# Patient Record
Sex: MALE | Race: WHITE | Employment: FULL TIME | URBAN - METROPOLITAN AREA
[De-identification: names, ages, dates, MRNs, and addresses within clinical notes are randomized per-mention and may not be internally consistent; named-entity substitution may affect disease eponyms.]

---

## 2021-01-17 ENCOUNTER — HOSPITAL ENCOUNTER (EMERGENCY)
Age: 61
Discharge: HOME | End: 2021-01-17
Payer: COMMERCIAL

## 2021-01-17 ENCOUNTER — APPOINTMENT (OUTPATIENT)
Dept: GENERAL RADIOLOGY | Age: 61
End: 2021-01-17
Payer: COMMERCIAL

## 2021-01-17 VITALS
RESPIRATION RATE: 20 BRPM | HEART RATE: 77 BPM | HEIGHT: 70 IN | DIASTOLIC BLOOD PRESSURE: 85 MMHG | WEIGHT: 260 LBS | BODY MASS INDEX: 37.22 KG/M2 | SYSTOLIC BLOOD PRESSURE: 147 MMHG | TEMPERATURE: 97.4 F | OXYGEN SATURATION: 95 %

## 2021-01-17 DIAGNOSIS — M54.31 SCIATICA OF RIGHT SIDE: ICD-10-CM

## 2021-01-17 DIAGNOSIS — M25.551 ACUTE RIGHT HIP PAIN: Primary | ICD-10-CM

## 2021-01-17 PROCEDURE — 73502 X-RAY EXAM HIP UNI 2-3 VIEWS: CPT

## 2021-01-17 PROCEDURE — 99283 EMERGENCY DEPT VISIT LOW MDM: CPT

## 2021-01-17 PROCEDURE — 6370000000 HC RX 637 (ALT 250 FOR IP): Performed by: PHYSICIAN ASSISTANT

## 2021-01-17 RX ORDER — LIDOCAINE 4 G/G
1 PATCH TOPICAL ONCE
Status: DISCONTINUED | OUTPATIENT
Start: 2021-01-17 | End: 2021-01-17 | Stop reason: HOSPADM

## 2021-01-17 RX ORDER — HYDROCODONE BITARTRATE AND ACETAMINOPHEN 5; 325 MG/1; MG/1
1 TABLET ORAL EVERY 6 HOURS PRN
Qty: 10 TABLET | Refills: 0 | Status: SHIPPED | OUTPATIENT
Start: 2021-01-17 | End: 2021-01-20

## 2021-01-17 RX ORDER — METHYLPREDNISOLONE 4 MG/1
2 TABLET ORAL DAILY
Qty: 3 TABLET | Refills: 0 | Status: SHIPPED | OUTPATIENT
Start: 2021-01-17 | End: 2021-01-23

## 2021-01-17 RX ORDER — LEVOTHYROXINE SODIUM 0.03 MG/1
25 TABLET ORAL DAILY
COMMUNITY

## 2021-01-17 RX ORDER — GABAPENTIN 800 MG/1
800 TABLET ORAL 3 TIMES DAILY
COMMUNITY

## 2021-01-17 RX ORDER — METHOCARBAMOL 750 MG/1
750 TABLET, FILM COATED ORAL 4 TIMES DAILY
COMMUNITY

## 2021-01-17 RX ORDER — CHLORPROMAZINE HYDROCHLORIDE 25 MG/1
25 TABLET, FILM COATED ORAL 3 TIMES DAILY
COMMUNITY

## 2021-01-17 RX ORDER — HYDROCODONE BITARTRATE AND ACETAMINOPHEN 5; 325 MG/1; MG/1
1 TABLET ORAL EVERY 4 HOURS PRN
Qty: 18 TABLET | Refills: 0 | Status: SHIPPED | OUTPATIENT
Start: 2021-01-17 | End: 2021-01-17

## 2021-01-17 ASSESSMENT — ENCOUNTER SYMPTOMS
VOMITING: 0
COUGH: 0
ABDOMINAL DISTENTION: 0
ABDOMINAL PAIN: 0
NAUSEA: 0
WHEEZING: 0
STRIDOR: 0
SHORTNESS OF BREATH: 0
BACK PAIN: 0
COLOR CHANGE: 0

## 2021-01-17 ASSESSMENT — PAIN DESCRIPTION - ORIENTATION: ORIENTATION: RIGHT

## 2021-01-17 NOTE — ED NOTES
Discharge instructions received & reviewed. Two prescriptions  & referral provided.   Alert & ambulatory at this time     Faisal Blackwell RN  01/17/21 9934

## 2021-01-17 NOTE — ED NOTES
Bed: 18  Expected date:   Expected time:   Means of arrival: Walk In  Comments:     Zen Castro RN  01/17/21 1829

## 2021-01-17 NOTE — ED PROVIDER NOTES
[address] [facility]        Pt Name: Sonny Toro  MRN: [id number]  [place] 1960  Date of evaluation: 1/17/2021  Provider: Kelly Fernandez PA-C  PCP: Diandra Lares MD    BAILEE. I have evaluated this patient. My supervising physician was available for consultation. CHIEF COMPLAINT       Chief Complaint   Patient presents with    Hip Pain     Pt c/o right hip pain since thursday with tingling burning sensation to foot. Denies injury \"Venita been getting in and out of a low car\". HISTORY OF PRESENT ILLNESS   (Location, Timing/Onset, Context/Setting, Quality, Duration, Modifying Factors, Severity, Associated Signs and Symptoms)  Note limiting factors. Sonny Toro is a 56 y.o. male who presents to the emergency department complaining of right lateral hip pain with sharp shooting pain down the right leg starting on Thursday. He does have history of degenerative disc disease in both neck and low back. 3 months ago, he was receiving injections of steroids to these areas by a neurosurgeon. He has not had any recent epidural injections. He denies preceding injury such as blunt trauma or fall. Patient is able to bear weight and ambulate. He denies bowel or bladder incontinence retention. Reports a tingling sensation over the right thigh and sharp burning pain in the right shin. Denies any swelling or skin changes. He says that he gets in and out of a low car and thinks that this is the source of his pain. Nursing Notes were all reviewed and agreed with or any disagreements were addressed in the HPI. REVIEW OF SYSTEMS    (2-9 systems for level 4, 10 or more for level 5)     Review of Systems   Constitutional: Negative for chills and fever. HENT: Negative. Eyes: Negative for visual disturbance. Respiratory: Negative for cough, shortness of breath, wheezing and stridor.     Cardiovascular: Negative for chest pain, palpitations and leg swelling. Gastrointestinal: Negative for abdominal distention, abdominal pain, nausea and vomiting. Genitourinary: Negative. Musculoskeletal: Positive for myalgias. Negative for arthralgias, back pain, gait problem, joint swelling, neck pain and neck stiffness. Skin: Negative for color change, pallor, rash and wound. Neurological: Positive for numbness. Negative for dizziness, tremors, seizures, syncope, facial asymmetry, speech difficulty, weakness, light-headedness and headaches. Psychiatric/Behavioral: Negative for confusion. All other systems reviewed and are negative. Positives and Pertinent negatives as per HPI. Except as noted above in the ROS, all other systems were reviewed and negative. PAST MEDICAL HISTORY     Past Medical History:   Diagnosis Date    Hypothyroid          SURGICAL HISTORY     Past Surgical History:   Procedure Laterality Date    BACK SURGERY           CURRENTMEDICATIONS       Previous Medications    CHLORPROMAZINE (THORAZINE) 25 MG TABLET    Take 25 mg by mouth 3 times daily    DICLOFENAC PO    Take 75 mg by mouth    GABAPENTIN (NEURONTIN) 800 MG TABLET    Take 800 mg by mouth 3 times daily. LEVOTHYROXINE (SYNTHROID) 25 MCG TABLET    Take 25 mcg by mouth Daily    METHOCARBAMOL (ROBAXIN) 750 MG TABLET    Take 750 mg by mouth 4 times daily         ALLERGIES     Patient has no known allergies. FAMILYHISTORY     History reviewed. No pertinent family history.        SOCIAL HISTORY       Social History     Tobacco Use    Smoking status: Never Smoker    Smokeless tobacco: Never Used   Substance Use Topics    Alcohol use: Not Currently    Drug use: Never       SCREENINGS             PHYSICAL EXAM    (up to 7 for level 4, 8 or more for level 5)     ED Triage Vitals [01/17/21 1112]   BP Temp Temp Source Pulse Resp SpO2 Height Weight   (!) 147/85 97.4 °F (36.3 °C) Infrared 77 20 95 % 5' 10\" (1.778 m) 260 lb (117.9 kg)       Physical Exam  Vitals signs and nursing note reviewed. Constitutional:       Appearance: Normal appearance. He is well-developed. He is not toxic-appearing or diaphoretic. HENT:      Head: Normocephalic and atraumatic. Right Ear: External ear normal.      Left Ear: External ear normal.      Nose: Nose normal.      Mouth/Throat:      Mouth: Mucous membranes are moist.      Pharynx: Oropharynx is clear. Eyes:      General: No scleral icterus. Right eye: No discharge. Left eye: No discharge. Extraocular Movements: Extraocular movements intact. Conjunctiva/sclera: Conjunctivae normal.      Pupils: Pupils are equal, round, and reactive to light. Neck:      Musculoskeletal: Normal range of motion. Cardiovascular:      Rate and Rhythm: Normal rate. Pulses:           Femoral pulses are 2+ on the right side and 2+ on the left side. Dorsalis pedis pulses are 2+ on the right side and 2+ on the left side. Posterior tibial pulses are 2+ on the right side and 2+ on the left side. Pulmonary:      Effort: Pulmonary effort is normal.      Breath sounds: Normal breath sounds. Abdominal:      General: Bowel sounds are normal. There is no distension or abdominal bruit. Palpations: Abdomen is soft. There is no pulsatile mass. Tenderness: There is no abdominal tenderness. There is no right CVA tenderness, left CVA tenderness, guarding or rebound. Negative signs include Pablo's sign, Rovsing's sign, McBurney's sign, psoas sign and obturator sign. Musculoskeletal: Normal range of motion. Right hip: He exhibits tenderness (lateral aspect). He exhibits normal range of motion, normal strength, no bony tenderness, no swelling, no crepitus, no deformity and no laceration.       Right knee: Normal.      Right ankle: Normal. Achilles tendon normal.      Cervical back: Normal.      Thoracic back: Normal.      Lumbar back: Normal.      Right upper leg: Normal.      Right lower orthopedic follow up      DISCHARGE MEDICATIONS:  New Prescriptions    HYDROCODONE-ACETAMINOPHEN (NORCO) 5-325 MG PER TABLET    Take 1 tablet by mouth every 6 hours as needed for Pain for up to 3 days.     METHYLPREDNISOLONE (MEDROL) 4 MG TABLET    Take 0.5 tablets by mouth daily for 6 days       DISCONTINUED MEDICATIONS:  Discontinued Medications    No medications on file              (Please note that portions of this note were completed with a voice recognition program.  Efforts were made to edit the dictations but occasionally words are mis-transcribed.)    Orly Johnson PA-C (electronically signed)            Orly Johnson PA-C  01/17/21 1680

## 2021-01-17 NOTE — ED NOTES
Presents to the ED for right hip pain lasting several days in duration. Describes the pain as 9/10 radiating to the right shin with numbness. Reports similar pain to his shoulders that pain management administers steroids to control.        Zilphia Osgood, RN  01/17/21 6175

## 2021-01-18 ENCOUNTER — OFFICE VISIT (OUTPATIENT)
Dept: ORTHOPEDIC SURGERY | Age: 61
End: 2021-01-18
Payer: COMMERCIAL

## 2021-01-18 VITALS — HEIGHT: 70 IN | WEIGHT: 242 LBS | TEMPERATURE: 97.9 F | BODY MASS INDEX: 34.65 KG/M2

## 2021-01-18 DIAGNOSIS — M70.61 GREATER TROCHANTERIC BURSITIS OF RIGHT HIP: Primary | ICD-10-CM

## 2021-01-18 PROCEDURE — 99203 OFFICE O/P NEW LOW 30 MIN: CPT | Performed by: PHYSICIAN ASSISTANT

## 2021-01-18 PROCEDURE — 20610 DRAIN/INJ JOINT/BURSA W/O US: CPT | Performed by: PHYSICIAN ASSISTANT

## 2021-01-18 RX ORDER — LIDOCAINE HYDROCHLORIDE 10 MG/ML
3 INJECTION, SOLUTION INFILTRATION; PERINEURAL ONCE
Status: COMPLETED | OUTPATIENT
Start: 2021-01-18 | End: 2021-01-18

## 2021-01-18 RX ORDER — BETAMETHASONE SODIUM PHOSPHATE AND BETAMETHASONE ACETATE 3; 3 MG/ML; MG/ML
6 INJECTION, SUSPENSION INTRA-ARTICULAR; INTRALESIONAL; INTRAMUSCULAR; SOFT TISSUE ONCE
Status: COMPLETED | OUTPATIENT
Start: 2021-01-18 | End: 2021-01-18

## 2021-01-18 RX ADMIN — LIDOCAINE HYDROCHLORIDE 3 ML: 10 INJECTION, SOLUTION INFILTRATION; PERINEURAL at 15:37

## 2021-01-18 RX ADMIN — BETAMETHASONE SODIUM PHOSPHATE AND BETAMETHASONE ACETATE 6 MG: 3; 3 INJECTION, SUSPENSION INTRA-ARTICULAR; INTRALESIONAL; INTRAMUSCULAR; SOFT TISSUE at 15:37

## 2021-01-18 NOTE — LETTER
[facility]  [address]  Phone: [phone]  Fax: [phone]    Arturo Marlow        January 18, 2021     Patient: Contreras Villalobos   YOB: 1960   Date of Visit: 1/18/2021       To Whom It May Concern: It is my medical opinion that Stephany Lea may return to work on 1/22/21 with no restrictions. If you have any questions or concerns, please don't hesitate to call.     Sincerely,          Leeland Curling, PA-C

## 2021-01-18 NOTE — PROGRESS NOTES
Patient Name: Derrick Hay  Medical Record Number: [id number]  YOB: 1960  Date of Encounter: 1/18/2021    Chief Complaint   Patient presents with    Hip Pain     Right hip pain since getting in and out of a little car; 1/14/21. History of Present Illness:  Derrick Hay is a 56 y.o. male here at the [facility] emergency department for evaluation of his right hip. His pain assessment is documented below and I reviewed this with him today. Patient states he had a small rental car last week and after spending several days getting in and out of the car he began having right lateral hip pain. He denies any specific injury to this area. He denies having any swelling, redness or warmth over this area. He went to the emergency department and had hip x-rays completed which were unremarkable. He was given a Medrol Dosepak as well as Norco.  Patient does have chronic neck and back pain with sciatica and gets epidural spine injections. He states his back pain is unchanged. Pain Assessment  Location of Pain: Pelvis  Location Modifiers: Right  Severity of Pain: 10  Quality of Pain: Sharp  Duration of Pain: Persistent  Frequency of Pain: Constant  Date Pain First Started: 01/14/21  Aggravating Factors:  Other (Comment)(Any movement)  Limiting Behavior: Yes  Relieving Factors: Rest, Other (Comment)(Sitting)  Result of Injury: No  Work-Related Injury: No  Are there other pain locations you wish to document?: No    Past Medical History:   Diagnosis Date    Hypothyroid        Past Surgical History:   Procedure Laterality Date    BACK SURGERY         Current Outpatient Medications   Medication Sig Dispense Refill    levothyroxine (SYNTHROID) 25 MCG tablet Take 25 mcg by mouth Daily      chlorproMAZINE (THORAZINE) 25 MG tablet Take 25 mg by mouth 3 times daily      DICLOFENAC PO Take 75 mg by mouth      gabapentin (NEURONTIN) 800 MG tablet Take 800 mg by mouth 3 times daily.      methocarbamol (ROBAXIN) 750 MG tablet Take 750 mg by mouth 4 times daily      methylPREDNISolone (MEDROL) 4 MG tablet Take 0.5 tablets by mouth daily for 6 days 3 tablet 0    HYDROcodone-acetaminophen (NORCO) 5-325 MG per tablet Take 1 tablet by mouth every 6 hours as needed for Pain for up to 3 days. 10 tablet 0     Current Facility-Administered Medications   Medication Dose Route Frequency Provider Last Rate Last Admin    betamethasone acetate-betamethasone sodium phosphate (CELESTONE) injection 6 mg  6 mg Intra-articular Once Alyson TONYA Rodriguez-NAN        lidocaine 1 % injection 3 mL  3 mL Intra-articular Once Alyson CAMILLE Rodriguez         Allergies, social and family histories were reviewed and updated as appropriate. Review of Systems:  Relevant review of systems reviewed and available in the patient's chart under the 'MEDIA' tab. Vital Signs:  Temp 97.9 °F (36.6 °C) (Infrared)   Ht 5' 10\" (1.778 m)   Wt 242 lb (109.8 kg)   BMI 34.72 kg/m²     General Exam:   Constitutional: Patient is adequately groomed with no evidence of malnutrition  Mental Status: The patient is oriented to time, place and person. The patient's mood and affect are appropriate. Lymphatic: The lymphatic examination bilaterally reveals all areas to be without enlargement or induration. Neurological: The patient has good coordination and balance. There is no focal weakness or sensory deficit. Right Hip Examination:    Inspection:  Normal muscle contours and no significant limb length discrepancy. No gross atrophy in any particular myotome. There is no obvious swelling or joint effusion of the hip. There are no abrasions, lacerations, contusions, hematomas or ecchymosis. There is no erythema, induration or warmth to suggest an infectious process. Palpation: Patient has tenderness on palpation over the greater trochanter.     Range of Motion:   Hip flexion: 115°   Hip abduction: 45°   Hip adduction: 30°   Hip internal rotation at 90° of flexion: 35°   hip external rotation at 90° of flexion: 50°       Knee range of motion shows functional range without pain. Ankle dorsiflexion and plantarflexion show functional range of motion. Strength:    Hip flexion  4+/5   Hip abduction 4+ / 5   Hip adduction 4+/5   Knee flexion and extension 4+-5/5 without pain   DF/ PF ankle 4+-5/5    Special Tests:    Log roll (intraarticular hip pathology) - negative   Stinchfield's Test (intraarticular hip pathology) - negative   Straight leg raise - negative   SEVEN Test 'Beka Test' (intraarticular hip or SI joint pathology) - negative  FADIR Test (femoro-acetabular impingement) - negative      Skin: There are no rashes, ulcerations or lesions. Sensation to light touch intact. Circulation: The limb is warm and well perfused. Distal pulses intact. Capillary refill is intact. Edema: none. Venous stasis changes: none. Gait: Patient has a antalgic gait and is taking short strides. Adaptive Device: None    Radiology:     Right hip x-rays completed in the emergency department on 1/17/2021 and reviewed in office today:       FINDINGS:   AP pelvis and two views of the right hip demonstrate no acute osseous   abnormality.  The SI joints are unremarkable.  The hip joints bilaterally are   intact with no significant arthritic changes. Orders:  Orders Placed This Encounter   Procedures    UT ARTHROCENTESIS ASPIR&/INJ MAJOR JT/BURSA W/O US       Impression:  Encounter Diagnosis   Name Primary?  Greater trochanteric bursitis of right hip Yes       Injection:  After discussing the risks and benefits of cortisone injection including increased pain, drug reaction, infection, bleeding, blood glucose elevation, lack of improvement and neurovascular injury he agreed to receive one today. Questions were encouraged and answered. The correct patient, procedure, site and side were identified.  The right hip was prepped with Betadine and 1 mL of betamethasone (6mg) mixed with 3 mL 1% lidocaine plain (30mg) were instilled into the greater trochanteric bursa under aseptic technique. The skin was then cleaned again with alcohol and a sterile adhesive dressing was applied. He tolerated this well and was instructed regarding post-injection care of icing and decreased activity as necessary. Treatment Plan:          Patient clinically has right greater trochanteric bursitis. Hip x-rays are unremarkable. He does have chronic low back pain with sciatica however this is unchanged. Patient was given a Medrol Dosepak and Claribel Gain yesterday. We discussed other conservative treatment options for trochanteric bursitis at today's visit and patient elected to proceed with cortisone injection. This was completed as recorded above in the procedure note. He is given a handout of home exercises and stretches. He will plan on following back in 3 weeks or before that time if pain does not improve. Contreras Villalobos was informed of the results of any imaging. We discussed treatment options and a time was given to answer questions. A plan was proposed and Contreras Villalobos understand and accepts this course of care. Electronically signed by Deandra Garcia PA-C on 1/32/2440  Board Certified [facility]    Please note that portions of this note were completed with a voice recognition program.  Efforts were made to edit the dictations but occasionally words are mis-transcribed.

## 2021-01-21 ENCOUNTER — HOSPITAL ENCOUNTER (OUTPATIENT)
Dept: GENERAL RADIOLOGY | Age: 61
Discharge: HOME | End: 2021-01-21
Payer: COMMERCIAL

## 2021-01-21 ENCOUNTER — HOSPITAL ENCOUNTER (OUTPATIENT)
Age: 61
Discharge: HOME | End: 2021-01-21
Payer: COMMERCIAL

## 2021-01-21 DIAGNOSIS — M54.50 LOW BACK PAIN, UNSPECIFIED BACK PAIN LATERALITY, UNSPECIFIED CHRONICITY, UNSPECIFIED WHETHER SCIATICA PRESENT: ICD-10-CM

## 2021-01-21 DIAGNOSIS — M54.16 LUMBAR RADICULOPATHY: ICD-10-CM

## 2021-01-21 PROCEDURE — 72110 X-RAY EXAM L-2 SPINE 4/>VWS: CPT
